# Patient Record
Sex: FEMALE | Race: WHITE | NOT HISPANIC OR LATINO | Employment: OTHER | URBAN - METROPOLITAN AREA
[De-identification: names, ages, dates, MRNs, and addresses within clinical notes are randomized per-mention and may not be internally consistent; named-entity substitution may affect disease eponyms.]

---

## 2017-02-04 ENCOUNTER — HOSPITAL ENCOUNTER (OUTPATIENT)
Dept: URGENT CARE | Facility: CLINIC | Age: 82
Setting detail: SPECIMEN
Discharge: HOME | End: 2017-02-04
Attending: FAMILY MEDICINE | Admitting: FAMILY MEDICINE

## 2017-02-04 ENCOUNTER — CONVERSION ENCOUNTER (OUTPATIENT)
Dept: URGENT CARE | Facility: CLINIC | Age: 82
End: 2017-02-04

## 2017-02-04 LAB
AMPICILLIN SUSC ISLT: ABNORMAL
AZTREONAM SUSC ISLT: ABNORMAL
BACTERIA ISLT: ABNORMAL
BACTERIA SPEC AEROBE CULT: ABNORMAL
CEFAZOLIN SUSC ISLT: ABNORMAL
CEFEPIME SUSC ISLT: ABNORMAL
CEFTRIAXONE SUSC ISLT: ABNORMAL
CIPROFLOXACIN SUSC ISLT: ABNORMAL
COLONY COUNT: ABNORMAL
ERTAPENEM SUSC ISLT: ABNORMAL
LEVOFLOXACIN SUSC ISLT: ABNORMAL
Lab: ABNORMAL
MEROPENEM SUSC ISLT: ABNORMAL
MICRO REPORT STATUS: ABNORMAL
NITROFURANTOIN SUSC ISLT: ABNORMAL
PIP+TAZO SUSC ISLT: ABNORMAL
SPECIMEN SOURCE: ABNORMAL
SUSC METH SPEC: ABNORMAL
TETRACYCLINE SUSC ISLT: ABNORMAL
TOBRAMYCIN SUSC ISLT: ABNORMAL
TRIMETHOPRIM/SULFA: ABNORMAL

## 2017-02-07 RX ORDER — DONEPEZIL HYDROCHLORIDE 5 MG/1
TABLET, FILM COATED ORAL
Qty: 90 TABLET | Refills: 3 | Status: SHIPPED | OUTPATIENT
Start: 2017-02-07

## 2017-02-07 RX ORDER — CHLORTHALIDONE 25 MG/1
TABLET ORAL
Qty: 45 TABLET | Refills: 3 | Status: SHIPPED | OUTPATIENT
Start: 2017-02-07

## 2017-02-10 ENCOUNTER — TELEPHONE (OUTPATIENT)
Dept: INTERNAL MEDICINE | Facility: CLINIC | Age: 82
End: 2017-02-10

## 2017-02-10 DIAGNOSIS — I10 BENIGN ESSENTIAL HYPERTENSION: Primary | ICD-10-CM

## 2017-02-10 NOTE — TELEPHONE ENCOUNTER
----- Message from Mona Veras MA sent at 2/10/2017  2:08 PM EST -----  Pt son calling to request CXR for admission to nursing facility.  pls advise      Nicolás Rahman #[phone]

## 2017-02-16 ENCOUNTER — OFFICE VISIT (OUTPATIENT)
Dept: INTERNAL MEDICINE | Facility: CLINIC | Age: 82
End: 2017-02-16

## 2017-02-16 ENCOUNTER — TELEPHONE (OUTPATIENT)
Dept: INTERNAL MEDICINE | Facility: CLINIC | Age: 82
End: 2017-02-16

## 2017-02-16 ENCOUNTER — APPOINTMENT (OUTPATIENT)
Dept: WOMENS IMAGING | Facility: HOSPITAL | Age: 82
End: 2017-02-16

## 2017-02-16 DIAGNOSIS — I10 BENIGN ESSENTIAL HYPERTENSION: ICD-10-CM

## 2017-02-16 PROCEDURE — 71020 CHG CHEST X-RAY 2 VW: CPT | Performed by: RADIOLOGY

## 2017-02-16 PROCEDURE — 71020 XR CHEST PA AND LATERAL: CPT | Performed by: INTERNAL MEDICINE

## 2017-02-16 NOTE — TELEPHONE ENCOUNTER
----- Message from Negrita Easton sent at 2/16/2017  3:58 PM EST -----  Patient's son would like for you to call him when her chest xray results are back. He would like to take them to the Assistant Living ( [facility]). Thank you. Chest Xray appt 2/16/17    Nicolás Lacey  [phone]

## 2017-02-27 RX ORDER — SIMVASTATIN 20 MG
TABLET ORAL
Qty: 90 TABLET | Refills: 2 | Status: SHIPPED | OUTPATIENT
Start: 2017-02-27

## 2017-03-16 ENCOUNTER — OFFICE VISIT (OUTPATIENT)
Dept: INTERNAL MEDICINE | Facility: CLINIC | Age: 82
End: 2017-03-16

## 2017-03-16 VITALS
DIASTOLIC BLOOD PRESSURE: 62 MMHG | WEIGHT: 141 LBS | SYSTOLIC BLOOD PRESSURE: 104 MMHG | HEIGHT: 60 IN | BODY MASS INDEX: 27.68 KG/M2

## 2017-03-16 DIAGNOSIS — E55.9 VITAMIN D DEFICIENCY: ICD-10-CM

## 2017-03-16 DIAGNOSIS — I10 BENIGN ESSENTIAL HYPERTENSION: ICD-10-CM

## 2017-03-16 DIAGNOSIS — E78.5 HYPERLIPIDEMIA, UNSPECIFIED HYPERLIPIDEMIA TYPE: Primary | ICD-10-CM

## 2017-03-16 LAB — 25(OH)D3 SERPL-MCNC: 51.2 NG/ML (ref 30–100)

## 2017-03-16 PROCEDURE — 80061 LIPID PANEL: CPT | Performed by: INTERNAL MEDICINE

## 2017-03-16 PROCEDURE — 80053 COMPREHEN METABOLIC PANEL: CPT | Performed by: INTERNAL MEDICINE

## 2017-03-16 PROCEDURE — 36415 COLL VENOUS BLD VENIPUNCTURE: CPT | Performed by: INTERNAL MEDICINE

## 2017-03-16 PROCEDURE — 84443 ASSAY THYROID STIM HORMONE: CPT | Performed by: INTERNAL MEDICINE

## 2017-03-16 PROCEDURE — 99214 OFFICE O/P EST MOD 30 MIN: CPT | Performed by: INTERNAL MEDICINE

## 2017-03-16 NOTE — PROGRESS NOTES
"Subjective   Hal Rahman is a 89 y.o. female. Here for HTN, hyperlipidemia and Vit D deficiency f/u.    History of Present Illness   Patient just moved to assisted living 3 weeks ago and had been doing well.    Visit Vitals   • /62   • Ht 60\" (152.4 cm)   • Wt 141 lb (64 kg)   • BMI 27.54 kg/m2       Current Outpatient Prescriptions:   •  amLODIPine (NORVASC) 5 MG tablet, TAKE 1 TABLET BY MOUTH EVERY DAY, Disp: 90 tablet, Rfl: 3  •  Calcium Carbonate-Vitamin D (CALCIUM-VITAMIN D) 600-125 MG-UNIT tablet, Take  by mouth., Disp: , Rfl:   •  chlorthalidone (HYGROTON) 25 MG tablet, TAKE 1/2 TABLET BY MOUTH DAILY, Disp: 45 tablet, Rfl: 3  •  Cholecalciferol (VITAMIN D) 1000 UNITS tablet, Take  by mouth., Disp: , Rfl:   •  donepezil (ARICEPT) 5 MG tablet, TAKE 1 TABLET BY MOUTH AT BEDTIME, Disp: 90 tablet, Rfl: 3  •  Glucosamine-Chondroit-Vit C-Mn (GLUCOSAMINE 1500 COMPLEX) capsule, Take  by mouth., Disp: , Rfl:   •  KLOR-CON 10 MEQ CR tablet, TAKE 2 TABLETS BY MOUTH EVERY DAY, Disp: 180 tablet, Rfl: 3  •  metoprolol succinate XL (TOPROL-XL) 100 MG 24 hr tablet, TAKE 1 TABLET BY MOUTH ONCE DAILY, Disp: 90 tablet, Rfl: 3  •  Multiple Vitamins-Minerals (CENTRUM SILVER ADULT 50+ PO), Take  by mouth., Disp: , Rfl:   •  raloxifene (EVISTA) 60 MG tablet, TAKE 1 TABLET DAILY AS DIRECTED., Disp: 90 tablet, Rfl: 0  •  simvastatin (ZOCOR) 20 MG tablet, TAKE ONE TABLET BY MOUTH EVERY EVENING, Disp: 90 tablet, Rfl: 2  •  vitamin E 100 UNIT capsule, Take  by mouth., Disp: , Rfl:     Patient has long-standing benign essential HTN.  BP is usually well controlled with daily use of Ca channel blocker, b-blocker and thiazide diuretic. On low salt diet with good compliance. Takes medication regularly. Denies chest pain, dyspnea,lightheadedness,  lower extremity edema. Patient has her BP checked regularly at the assisted living facility and her BP had been in the good range.    Patient has been diagnosed with hyperlipidemia. Target " "LDL is < 100 mg/dl (CHD or \"CHD risk equivalent\" is present). Patient is on low fat diet with good compliance. Patient is compliant with treatment: daily use of Zocor (simvastatin). Side effects of medication: none. Exercise none.    Patient had been diagnosed with Vitamin D deficiency. Takes over the counter Vit D3 1000 IU a day. Patient does not have complaints of muscle or bone aches. Balance is fair. She uses walker at all times. No recent falls.     Patient had developed dry cough 3-4 days ago. No fever/chills, no nasal congestion.    The following portions of the patient's history were reviewed and updated as appropriate: allergies, current medications, past family history, past medical history, past social history, past surgical history and problem list.    Review of Systems   Constitutional: Negative for activity change, appetite change, chills, fatigue and fever.   HENT: Negative for congestion, ear discharge and ear pain.    Eyes: Negative for pain and discharge.   Respiratory: Positive for cough. Negative for apnea, chest tightness and shortness of breath.    Endocrine: Negative.    Musculoskeletal: Negative.        Objective   Physical Exam   Constitutional: She is oriented to person, place, and time. She appears well-developed and well-nourished.   HENT:   Head: Normocephalic and atraumatic.   Right Ear: Tympanic membrane, external ear and ear canal normal.   Left Ear: Tympanic membrane, external ear and ear canal normal.   Nose: Nose normal. Right sinus exhibits no maxillary sinus tenderness and no frontal sinus tenderness. Left sinus exhibits no maxillary sinus tenderness and no frontal sinus tenderness.   Mouth/Throat: Uvula is midline, oropharynx is clear and moist and mucous membranes are normal.   Eyes: Conjunctivae and EOM are normal. Pupils are equal, round, and reactive to light. Right eye exhibits no discharge. Left eye exhibits no discharge. No scleral icterus.   Neck: Neck supple. No JVD " "present.   Cardiovascular: Normal rate, regular rhythm and normal heart sounds.  Exam reveals no gallop and no friction rub.    No murmur heard.  Pulmonary/Chest: Effort normal and breath sounds normal. She has no wheezes. She has no rales.   Musculoskeletal: She exhibits no edema.   Lymphadenopathy:     She has no cervical adenopathy.   Neurological: She is alert and oriented to person, place, and time. No cranial nerve deficit.   Skin: Skin is warm and dry. No rash noted.   Psychiatric: She has a normal mood and affect. Her behavior is normal.   Vitals reviewed.      Assessment/Plan   Diagnoses and all orders for this visit:    Hyperlipidemia, unspecified hyperlipidemia type  -     Comprehensive Metabolic Panel; Future  -     Lipid Panel; Future  -     TSH; Future  -     Comprehensive Metabolic Panel  -     Lipid Panel  -     TSH    Vitamin D deficiency  -     Vitamin D 25 Hydroxy; Future  -     Vitamin D 25 Hydroxy    Benign essential hypertension    HTN - well controlled with current medication regimen. Will check kidney tests and electrolytes. Recommended low salt diet. Continue same medical treatment.  Hyperlipidemia - well controlled with statin in a past. Will check cholesterol and liver function tests. LDL target is below < 100 mg/dl (CHD or \"CHD risk equivalent\" is present).Continue same treatment. Regular exercise recommended.  Vitamin D deficiency - well compensated with over the counter Vit D3. Continue same dose. This supplement is fat-soluble and has to be taken with meals.  Cough - reassured, most likely allergies. No need for treatment.         "

## 2017-03-17 LAB
ALBUMIN SERPL-MCNC: 3.38 G/DL (ref 3.4–4.6)
ALBUMIN/GLOB SERPL: 0.8 G/DL
ALP SERPL-CCNC: 70 U/L (ref 46–116)
ALT SERPL W P-5'-P-CCNC: 22 U/L (ref 14–59)
ANION GAP SERPL CALCULATED.3IONS-SCNC: 6 MMOL/L
AST SERPL-CCNC: 21 U/L (ref 7–37)
BILIRUB SERPL-MCNC: 0.2 MG/DL (ref 0.2–1)
BUN BLD-MCNC: 18 MG/DL (ref 6–22)
BUN/CREAT SERPL: 19.1 (ref 7–25)
CALCIUM SPEC-SCNC: 8.9 MG/DL (ref 8.6–10.5)
CHLORIDE SERPL-SCNC: 101 MMOL/L (ref 95–107)
CHOLEST SERPL-MCNC: 166 MG/DL (ref 0–200)
CO2 SERPL-SCNC: 33 MMOL/L (ref 23–32)
CREAT BLD-MCNC: 0.94 MG/DL (ref 0.55–1.02)
GFR SERPL CREATININE-BSD FRML MDRD: 56 ML/MIN/1.73
GLOBULIN UR ELPH-MCNC: 4.1 GM/DL
GLUCOSE BLD-MCNC: 103 MG/DL (ref 70–100)
HDLC SERPL-MCNC: 56 MG/DL (ref 40–81)
LDLC SERPL CALC-MCNC: 86 MG/DL (ref 0–100)
LDLC/HDLC SERPL: 1.53 {RATIO}
POTASSIUM BLD-SCNC: 3.4 MMOL/L (ref 3.3–5.3)
PROT SERPL-MCNC: 7.5 G/DL (ref 6.3–8.4)
SODIUM BLD-SCNC: 140 MMOL/L (ref 136–145)
TRIGL SERPL-MCNC: 121 MG/DL (ref 0–150)
TSH SERPL DL<=0.05 MIU/L-ACNC: 0.96 MIU/ML (ref 0.4–4.2)
VLDLC SERPL-MCNC: 24.2 MG/DL

## 2017-03-27 RX ORDER — AMLODIPINE BESYLATE 5 MG/1
TABLET ORAL
Qty: 90 TABLET | Refills: 3 | Status: SHIPPED | OUTPATIENT
Start: 2017-03-27

## 2017-04-24 RX ORDER — RALOXIFENE HYDROCHLORIDE 60 MG/1
TABLET, FILM COATED ORAL
Qty: 90 TABLET | Refills: 3 | Status: SHIPPED | OUTPATIENT
Start: 2017-04-24 | End: 2017-10-02

## 2017-07-25 ENCOUNTER — TELEPHONE (OUTPATIENT)
Dept: INTERNAL MEDICINE | Facility: CLINIC | Age: 82
End: 2017-07-25

## 2017-07-25 NOTE — TELEPHONE ENCOUNTER
----- Message from Elizabeth Don sent at 7/25/2017  9:53 AM EDT -----  Contact: Gabby Pierre, RN with [facility] called.  Patient had a fall this morning, and RN requesting verbal order for right elbow, upper arm, and right ribs x-ray.  Please advise.      Gabby:  [phone]

## 2017-09-29 ENCOUNTER — TELEPHONE (OUTPATIENT)
Dept: INTERNAL MEDICINE | Facility: CLINIC | Age: 82
End: 2017-09-29

## 2017-09-29 NOTE — TELEPHONE ENCOUNTER
[facility] is requesting order for Psych Eval. Needed due to patient exhibiting increased agitation with aggressive behaviors with staff while staff is attempting to administer AM medications and trying to provide resident care and shower care. Patient cancelled appointment for this week. Please advise.       Order needs to be faxed to [phone]

## 2017-09-29 NOTE — TELEPHONE ENCOUNTER
Please call son and give him this options, also , please inform him of her behavioral issues. He had cancelled her AWV this week. She needs one (45 min ), or they need to change to facility phisician. It might be more convenient for everyone if facility physician will see her on site and manage all her issues.This is a second request for psych dannyal, and this is fine with me.OK to give verbal order

## 2017-10-02 ENCOUNTER — CLINICAL SUPPORT (OUTPATIENT)
Dept: INTERNAL MEDICINE | Facility: CLINIC | Age: 82
End: 2017-10-02

## 2017-10-02 ENCOUNTER — OFFICE VISIT (OUTPATIENT)
Dept: INTERNAL MEDICINE | Facility: CLINIC | Age: 82
End: 2017-10-02

## 2017-10-02 VITALS
DIASTOLIC BLOOD PRESSURE: 62 MMHG | RESPIRATION RATE: 14 BRPM | WEIGHT: 141 LBS | BODY MASS INDEX: 27.68 KG/M2 | HEIGHT: 60 IN | SYSTOLIC BLOOD PRESSURE: 114 MMHG

## 2017-10-02 DIAGNOSIS — E78.5 HYPERLIPIDEMIA, UNSPECIFIED HYPERLIPIDEMIA TYPE: ICD-10-CM

## 2017-10-02 DIAGNOSIS — E78.00 PURE HYPERCHOLESTEROLEMIA: ICD-10-CM

## 2017-10-02 DIAGNOSIS — Z00.00 HEALTH CARE MAINTENANCE: Primary | ICD-10-CM

## 2017-10-02 DIAGNOSIS — I10 BENIGN ESSENTIAL HYPERTENSION: ICD-10-CM

## 2017-10-02 DIAGNOSIS — Z78.0 POST-MENOPAUSE: ICD-10-CM

## 2017-10-02 DIAGNOSIS — E55.9 VITAMIN D DEFICIENCY: ICD-10-CM

## 2017-10-02 LAB
25(OH)D3 SERPL-MCNC: 64.7 NG/ML (ref 30–100)
ALBUMIN SERPL-MCNC: 3.9 G/DL (ref 3.5–5.2)
ALBUMIN/GLOB SERPL: 1.1 G/DL
ALP SERPL-CCNC: 60 U/L (ref 39–117)
ALT SERPL W P-5'-P-CCNC: 14 U/L (ref 1–33)
ANION GAP SERPL CALCULATED.3IONS-SCNC: 10.8 MMOL/L
AST SERPL-CCNC: 15 U/L (ref 1–32)
BACTERIA UR QL AUTO: ABNORMAL /HPF
BILIRUB SERPL-MCNC: 0.3 MG/DL (ref 0.1–1.2)
BILIRUB UR QL STRIP: NEGATIVE
BUN BLD-MCNC: 21 MG/DL (ref 8–23)
BUN/CREAT SERPL: 16.7 (ref 7–25)
CALCIUM SPEC-SCNC: 9.8 MG/DL (ref 8.2–9.6)
CHLORIDE SERPL-SCNC: 98 MMOL/L (ref 98–107)
CHOLEST SERPL-MCNC: 144 MG/DL (ref 0–200)
CLARITY UR: ABNORMAL
CO2 SERPL-SCNC: 31.2 MMOL/L (ref 22–29)
COLOR UR: YELLOW
CREAT BLD-MCNC: 1.26 MG/DL (ref 0.57–1)
GFR SERPL CREATININE-BSD FRML MDRD: 40 ML/MIN/1.73
GLOBULIN UR ELPH-MCNC: 3.7 GM/DL
GLUCOSE BLD-MCNC: 91 MG/DL (ref 65–99)
GLUCOSE UR STRIP-MCNC: NEGATIVE MG/DL
HDLC SERPL-MCNC: 58 MG/DL (ref 40–60)
HGB UR QL STRIP.AUTO: ABNORMAL
HYALINE CASTS UR QL AUTO: ABNORMAL /LPF
KETONES UR QL STRIP: NEGATIVE
LDLC SERPL CALC-MCNC: 56 MG/DL (ref 0–100)
LDLC/HDLC SERPL: 0.97 {RATIO}
LEUKOCYTE ESTERASE UR QL STRIP.AUTO: ABNORMAL
NITRITE UR QL STRIP: NEGATIVE
PH UR STRIP.AUTO: <=5 [PH] (ref 5–8)
POTASSIUM BLD-SCNC: 3.8 MMOL/L (ref 3.5–5.2)
PROT SERPL-MCNC: 7.6 G/DL (ref 6–8.5)
PROT UR QL STRIP: NEGATIVE
RBC # UR: ABNORMAL /HPF
REF LAB TEST METHOD: ABNORMAL
SODIUM BLD-SCNC: 140 MMOL/L (ref 136–145)
SP GR UR STRIP: 1.01 (ref 1–1.03)
SQUAMOUS #/AREA URNS HPF: ABNORMAL /HPF
TRIGL SERPL-MCNC: 150 MG/DL (ref 0–150)
TSH SERPL DL<=0.05 MIU/L-ACNC: 2.09 MIU/ML (ref 0.27–4.2)
UROBILINOGEN UR QL STRIP: ABNORMAL
VLDLC SERPL-MCNC: 30 MG/DL (ref 5–40)
WBC UR QL AUTO: ABNORMAL /HPF

## 2017-10-02 PROCEDURE — 80053 COMPREHEN METABOLIC PANEL: CPT | Performed by: INTERNAL MEDICINE

## 2017-10-02 PROCEDURE — 81001 URINALYSIS AUTO W/SCOPE: CPT | Performed by: INTERNAL MEDICINE

## 2017-10-02 PROCEDURE — 82306 VITAMIN D 25 HYDROXY: CPT | Performed by: INTERNAL MEDICINE

## 2017-10-02 PROCEDURE — 80061 LIPID PANEL: CPT | Performed by: INTERNAL MEDICINE

## 2017-10-02 PROCEDURE — G0439 PPPS, SUBSEQ VISIT: HCPCS | Performed by: INTERNAL MEDICINE

## 2017-10-02 PROCEDURE — 36415 COLL VENOUS BLD VENIPUNCTURE: CPT | Performed by: INTERNAL MEDICINE

## 2017-10-02 PROCEDURE — 77080 DXA BONE DENSITY AXIAL: CPT | Performed by: INTERNAL MEDICINE

## 2017-10-02 PROCEDURE — 84443 ASSAY THYROID STIM HORMONE: CPT | Performed by: INTERNAL MEDICINE

## 2017-10-02 PROCEDURE — 99214 OFFICE O/P EST MOD 30 MIN: CPT | Performed by: INTERNAL MEDICINE

## 2017-10-02 RX ORDER — ESCITALOPRAM OXALATE 5 MG/1
TABLET ORAL
Refills: 12 | COMMUNITY
Start: 2017-09-22

## 2017-10-02 NOTE — PROGRESS NOTES
"Joleen Rahman is a 89 y.o. female.     History of Present Illness   /62 (BP Location: Left arm, Patient Position: Sitting, Cuff Size: Adult)  Resp 14  Ht 60\" (152.4 cm)  Wt 141 lb (64 kg)  BMI 27.54 kg/m2  Patient is being seen for subsequent wellness visit.  Hospitalizations in a past: 5, all surgical and GYN  Once per lifetime Medicare screening tests: ECG - 06/28/2012    AAA risk assessment: 1. FH: none. 2.H/o tobacco smoking: none. 3. CV or PAD: none.    Tobacco use: none   Alcohol use: none  Illicit drugs use: none  Diet: healthy heart  Exercise: in PT    Anxiety screening: How many days in the last 2 weeks you were  1. Feeling anxious, nervous, on edge: none  2. Unable to stop worrying: none  3. Worrying too much about different things: none  4. Having problems relaxing:none  5. Feeling restless or unable to sit still:none  6. Feeling irritable or easely annoyed: none  7. Being afraid that something awful might happen: none   Per son she had been started on Escitalopram 5 mg a day, and this made a great improvement to her mood and attitude.   According to NH she had been agitated and combative in the mornings.    Depression screening PHQ9:  Over the past 2 weeks how often have you been bothered by  1. Lack of interest or pleasuer in usual activities: none  2. Feeling down, depressed, hopeless:none  3. Troubles falling asleep/sleeping too long:none  4. Feeling tired, having little energy: none  5. Poor appetite or overeating: none  6. Feeling bad about yourself:none.  7. Trouble concentrating: at the baseline.  8. Moving or speaking too slow or much faster than usual: none  9. Thoughts about harming yourself:none.  Per son doing well on Escitalopram.    Cognitive impairment screening:   Do you have difficulties with:  1. Language: no  2. Behavior: yes, as above  3. Learning/retaining new information: no  4. Handling complex tasks: yes  5. Reasoning: yes  6. Spacial ability and " "orientation: yes    Please refer to Mini Cog (number or correctly recalled words 0 and unable to drow the clock correctly).    Hearing: impaired.   Driving: none.  ADL: Partial assistance  ADL details: Does patient needs help with:  telephone use: no  Transportation: yes  Housework: yes  Shopping: yes  meal preparation: yes  laundry: yes  managing medication: yes  Participate in the social activities: yes. Patient lives in assisted living.    Fall risk factors:   1.Use of alcohol: no    2.Polypharmacy: yes  3.H/o of previous fall:  Y  4. Mobility impairment:  Y  5. Visual impairment:  no  6. Deconditioning: Y  7. Use of antihypertensive medication:  yes  8. Use of sedatives: no  9. Use of antidepressant: yes    Home safety:   1.loose rugs: no   2.unfamiliar environment: no   3. Uneven floors: no   4. No grab bars in the bathroom:  no  5. No banister on stairs: no      Advanced directives: has POA.Son is POA.    Co-managers: general surgeon .      /62 (BP Location: Left arm, Patient Position: Sitting, Cuff Size: Adult)  Resp 14  Ht 60\" (152.4 cm)  Wt 141 lb (64 kg)  BMI 27.54 kg/m2    Current Outpatient Prescriptions:   •  amLODIPine (NORVASC) 5 MG tablet, TAKE 1 TABLET BY MOUTH EVERY DAY, Disp: 90 tablet, Rfl: 3  •  Calcium Carbonate-Vitamin D (CALCIUM-VITAMIN D) 600-125 MG-UNIT tablet, Take  by mouth., Disp: , Rfl:   •  chlorthalidone (HYGROTON) 25 MG tablet, TAKE 1/2 TABLET BY MOUTH DAILY, Disp: 45 tablet, Rfl: 3  •  Cholecalciferol (VITAMIN D) 1000 UNITS tablet, Take  by mouth., Disp: , Rfl:   •  donepezil (ARICEPT) 5 MG tablet, TAKE 1 TABLET BY MOUTH AT BEDTIME, Disp: 90 tablet, Rfl: 3  •  escitalopram (LEXAPRO) 5 MG tablet, TAKE ONE TABLET BY MOUTH DAILY, Disp: , Rfl: 12  •  Glucosamine-Chondroit-Vit C-Mn (GLUCOSAMINE 1500 COMPLEX) capsule, Take  by mouth., Disp: , Rfl:   •  GNP CALCIUM 600 +D3 600-800 MG-UNIT tablet, TAKE ONE TABLET BY MOUTH DAILY, Disp: , Rfl: 12  •  KLOR-CON 10 MEQ CR " "tablet, TAKE 2 TABLETS BY MOUTH EVERY DAY, Disp: 180 tablet, Rfl: 3  •  metoprolol succinate XL (TOPROL-XL) 100 MG 24 hr tablet, TAKE 1 TABLET BY MOUTH ONCE DAILY, Disp: 90 tablet, Rfl: 3  •  Multiple Vitamins-Minerals (CENTRUM SILVER ADULT 50+ PO), Take  by mouth., Disp: , Rfl:   •  raloxifene (EVISTA) 60 MG tablet, TAKE 1 TABLET DAILY AS DIRECTED., Disp: 90 tablet, Rfl: 0  •  raloxifene (EVISTA) 60 MG tablet, TAKE 1 TABLET BY MOUTH DAILY AS DIRECTED., Disp: 90 tablet, Rfl: 3  •  simvastatin (ZOCOR) 20 MG tablet, TAKE ONE TABLET BY MOUTH EVERY EVENING, Disp: 90 tablet, Rfl: 2  •  vitamin E 100 UNIT capsule, Take  by mouth., Disp: , Rfl:     Patient has long-standing benign essential HTN.  BP is usually well controlled with daily use of Ca channel blocker, b-blocker and thiazide diuretic. On low salt diet with fair compliance. Medication is being dispenced regularly. Denies chest pain, dyspnea,lightheadedness,  lower extremity edema. BP is regulaarly checked at the facility, and had been normal per son.    Patient has been diagnosed with hyperlipidemia. Target LDL is < 100 mg/dl (CHD or \"CHD risk equivalent\" is present). Patient is on low fat diet with good compliance. Patient is compliant with treatment: daily use of Zocor (simvastatin). Side effects of medication: none. Exercise in PT.    Patient had been diagnosed with Vitamin D deficiency. Takes over the counter Ca with D. Patient does not have complaints of muscle or bone aches. Balance is fair. No recent falls.                                   The following portions of the patient's history were reviewed and updated as appropriate: allergies, current medications, past family history, past medical history, past social history, past surgical history and problem list.    Review of Systems   Constitutional: Negative for chills and fever.   HENT: Negative for postnasal drip, sinus pressure and sore throat.    Eyes: Negative for pain and itching.   Respiratory: " Negative for cough and chest tightness.    Cardiovascular: Negative for chest pain and leg swelling.   Gastrointestinal: Negative for abdominal pain and blood in stool.   Endocrine: Negative for cold intolerance and heat intolerance.   Genitourinary: Negative for difficulty urinating and flank pain.   Musculoskeletal: Negative for back pain and neck pain.   Skin: Negative for color change and rash.   Neurological: Negative for dizziness, weakness and headaches.   Hematological: Negative for adenopathy. Does not bruise/bleed easily.   Psychiatric/Behavioral: Negative for sleep disturbance. The patient is nervous/anxious.        Objective   Physical Exam   Constitutional: She is oriented to person, place, and time. She appears well-developed and well-nourished. No distress.   HENT:   Head: Normocephalic and atraumatic.   Right Ear: External ear normal.   Left Ear: External ear normal.   Nose: Nose normal.   Mouth/Throat: Oropharynx is clear and moist. No oropharyngeal exudate.   Eyes: Conjunctivae and EOM are normal. Pupils are equal, round, and reactive to light. Right eye exhibits no discharge. Left eye exhibits no discharge. No scleral icterus.   Neck: Normal range of motion. Neck supple. No JVD present. No thyromegaly present.   Cardiovascular: Normal rate, regular rhythm, S1 normal, S2 normal, normal heart sounds and intact distal pulses.  Exam reveals no gallop and no friction rub.    No murmur heard.  Pulmonary/Chest: Effort normal and breath sounds normal. No respiratory distress. She has no decreased breath sounds. She has no wheezes. She has no rhonchi. She has no rales. Right breast exhibits no inverted nipple, no mass, no nipple discharge, no skin change and no tenderness. Left breast exhibits no inverted nipple, no mass, no nipple discharge, no skin change and no tenderness. Breasts are symmetrical.   Abdominal: Soft. Bowel sounds are normal. She exhibits no distension and no mass. There is no tenderness.  There is no rebound and no guarding.   Musculoskeletal: She exhibits deformity (osteoarthritic changes hands). She exhibits no edema.   Lymphadenopathy:        Head (right side): No submental, no submandibular, no preauricular, no posterior auricular and no occipital adenopathy present.        Head (left side): No submental, no submandibular, no preauricular, no posterior auricular and no occipital adenopathy present.     She has no cervical adenopathy.        Right cervical: No superficial cervical, no deep cervical and no posterior cervical adenopathy present.       Left cervical: No superficial cervical, no deep cervical and no posterior cervical adenopathy present.     She has no axillary adenopathy.        Right: No supraclavicular adenopathy present.        Left: No supraclavicular adenopathy present.   Neurological: She is alert and oriented to person, place, and time. She has normal reflexes. She displays normal reflexes. No cranial nerve deficit. She exhibits normal muscle tone. Coordination normal.   Reflex Scores:       Bicep reflexes are 2+ on the right side and 2+ on the left side.       Patellar reflexes are 2+ on the right side and 2+ on the left side.  Skin: Skin is warm and dry. No rash noted. She is not diaphoretic. No erythema.   Psychiatric: She has a normal mood and affect. Her behavior is normal. Thought content normal.   Vitals reviewed.      Assessment/Plan   Hal was seen today for subsequent medicare wellness, hypertension, hyperlipidemia and vitamin d deficiency.    Diagnoses and all orders for this visit:    Health care maintenance    Benign essential hypertension    Vitamin D deficiency  -     Vitamin D 25 Hydroxy; Future    Pure hypercholesterolemia  -     Comprehensive Metabolic Panel; Future  -     Lipid Panel; Future    Post-menopause  -     DEXA Bone Density Axial; Future        Annual wellness visit with preventive exam as well as age and risk appropriate councelling  "completed.    Cardiovascular disease councelling: current. Recommended: Continue statin.  Diabetes screening and councelling: current. Recommended: Not at risk for diabetes.  Breast cancer screening: not recommended due to age and comorbidities..  Osteoporosis screening: is due, will schedule. Benefits explained..  Glaucoma screening: up to date.   AAA screening: not needed..  Hep C screening (born between [birth year]) not needed, patient is not in the age group, and has no risk factors..  Colorectal cancer screening: not recommended due to advanced age and comorbidities..    Immunizations:   1. Influenza vaccine  is up to date and recommended yearly.   2. Pneumococcal vaccines: completed.   3. Zostavax: completed.      Advanced directives planning: has POA..    Medications reviewed and medication list updated.   Potential harmful drug-disease interactions in the elderly:none.  High risk medication in elderly: none  ASA use: no indications.    HTN - well controlled with current medication regimen.Will check kidney tests and electrolytes. Recommended low salt diet. Continue same medical treatment.  Hyperlipidemia - well controlled with statin in a past. Will check lipids and liver function tests. LDL target is below < 100 mg/dl (CHD or \"CHD risk equivalent\" is present). Continue same treatment.  Vitamin D deficiency - well compensated with over the counter Vit D3 in a past. Will check blood Vit D level. Continue same dose. This supplement is fat-soluble and has to be taken with meals.  Osteopenia - will check DEXA and decide if needs to continue Evista.     "

## 2017-10-02 NOTE — PATIENT INSTRUCTIONS
"Annual wellness visit with preventive exam as well as age and risk appropriate councelling completed.    Cardiovascular disease councelling: current. Recommended: Continue statin.  Diabetes screening and councelling: current. Recommended: Not at risk for diabetes.  Breast cancer screening: not recommended due to age and comorbidities..  Osteoporosis screening: is due, will schedule. Benefits explained..  Glaucoma screening: up to date.   AAA screening: not needed..  Hep C screening (born between 8[birth year]) not needed, patient is not in the age group, and has no risk factors..  Colorectal cancer screening: not recommended due to advanced age and comorbidities..    Immunizations:   1. Influenza vaccine  is up to date and recommended yearly.   2. Pneumococcal vaccines: completed.   3. Zostavax: completed.      Advanced directives planning: has POA..    Medications reviewed and medication list updated.   Potential harmful drug-disease interactions in the elderly:none.  High risk medication in elderly: none  ASA use: no indications.    HTN - well controlled with current medication regimen.Will check kidney tests and electrolytes. Recommended low salt diet. Continue same medical treatment.  Hyperlipidemia - well controlled with statin in a past. Will check lipids and liver function tests. LDL target is below < 100 mg/dl (CHD or \"CHD risk equivalent\" is present). Continue same treatment.  Vitamin D deficiency - well compensated with over the counter Vit D3 in a past. Will check blood Vit D level. Continue same dose. This supplement is fat-soluble and has to be taken with meals.     Return in about 6 months (around 4/2/2018) for HTN, cholest, Vit D def.  "

## 2017-10-25 ENCOUNTER — TELEPHONE (OUTPATIENT)
Dept: INTERNAL MEDICINE | Facility: CLINIC | Age: 82
End: 2017-10-25

## 2017-10-25 NOTE — TELEPHONE ENCOUNTER
"----- Message from Carlee Zapata sent at 10/25/2017 12:16 PM EDT -----  Beatrice for [facility] calling.  Pt is in Assisted Living and is now \"exit seeking\"- she was found today outside near the road. She has been verbalize going to find her car in [place] but is now acting on it.  They have called the family and spoke with them. They would like to get an order for higher level of care so they can look to getting her on a memory care unit.  Please advise.    Beatrice for [facility] phone # [phone] ext [phone]  Fax# [phone]  "

## 2019-06-04 VITALS
WEIGHT: 122 LBS | RESPIRATION RATE: 16 BRPM | DIASTOLIC BLOOD PRESSURE: 76 MMHG | BODY MASS INDEX: 23.83 KG/M2 | HEART RATE: 88 BPM | OXYGEN SATURATION: 98 % | SYSTOLIC BLOOD PRESSURE: 137 MMHG
